# Patient Record
Sex: MALE | Race: WHITE | NOT HISPANIC OR LATINO | URBAN - METROPOLITAN AREA
[De-identification: names, ages, dates, MRNs, and addresses within clinical notes are randomized per-mention and may not be internally consistent; named-entity substitution may affect disease eponyms.]

---

## 2017-05-26 ENCOUNTER — OUTPATIENT (OUTPATIENT)
Dept: OUTPATIENT SERVICES | Facility: HOSPITAL | Age: 62
LOS: 1 days | Discharge: HOME | End: 2017-05-26
Payer: COMMERCIAL

## 2017-05-26 DIAGNOSIS — K57.30 DIVERTICULOSIS OF LARGE INTESTINE WITHOUT PERFORATION OR ABSCESS WITHOUT BLEEDING: ICD-10-CM

## 2017-05-26 DIAGNOSIS — Z98.890 OTHER SPECIFIED POSTPROCEDURAL STATES: Chronic | ICD-10-CM

## 2017-05-26 DIAGNOSIS — Z12.11 ENCOUNTER FOR SCREENING FOR MALIGNANT NEOPLASM OF COLON: ICD-10-CM

## 2017-05-26 DIAGNOSIS — K64.8 OTHER HEMORRHOIDS: ICD-10-CM

## 2017-05-26 DIAGNOSIS — Z95.5 PRESENCE OF CORONARY ANGIOPLASTY IMPLANT AND GRAFT: ICD-10-CM

## 2017-05-26 DIAGNOSIS — Z95.5 PRESENCE OF CORONARY ANGIOPLASTY IMPLANT AND GRAFT: Chronic | ICD-10-CM

## 2017-05-26 DIAGNOSIS — Z79.82 LONG TERM (CURRENT) USE OF ASPIRIN: ICD-10-CM

## 2017-05-26 DIAGNOSIS — I25.10 ATHEROSCLEROTIC HEART DISEASE OF NATIVE CORONARY ARTERY WITHOUT ANGINA PECTORIS: ICD-10-CM

## 2017-05-26 DIAGNOSIS — Z01.818 ENCOUNTER FOR OTHER PREPROCEDURAL EXAMINATION: ICD-10-CM

## 2017-05-26 LAB
ANION GAP SERPL CALC-SCNC: 4 MMOL/L — LOW (ref 5–17)
BASOPHILS # BLD AUTO: 0.1 K/UL — SIGNIFICANT CHANGE UP (ref 0–0.2)
BASOPHILS NFR BLD AUTO: 1.6 % — SIGNIFICANT CHANGE UP (ref 0–2)
BUN SERPL-MCNC: 28 MG/DL — HIGH (ref 7–23)
CALCIUM SERPL-MCNC: 8.9 MG/DL — SIGNIFICANT CHANGE UP (ref 8.5–10.1)
CHLORIDE SERPL-SCNC: 108 MMOL/L — SIGNIFICANT CHANGE UP (ref 96–108)
CO2 SERPL-SCNC: 27 MMOL/L — SIGNIFICANT CHANGE UP (ref 22–31)
CREAT SERPL-MCNC: 1.17 MG/DL — SIGNIFICANT CHANGE UP (ref 0.5–1.3)
EOSINOPHIL # BLD AUTO: 0.2 K/UL — SIGNIFICANT CHANGE UP (ref 0–0.5)
EOSINOPHIL NFR BLD AUTO: 3 % — SIGNIFICANT CHANGE UP (ref 0–6)
GLUCOSE SERPL-MCNC: 91 MG/DL — SIGNIFICANT CHANGE UP (ref 70–99)
HCT VFR BLD CALC: 48.2 % — SIGNIFICANT CHANGE UP (ref 39–50)
HGB BLD-MCNC: 15.9 G/DL — SIGNIFICANT CHANGE UP (ref 13–17)
LYMPHOCYTES # BLD AUTO: 2.5 K/UL — SIGNIFICANT CHANGE UP (ref 1–3.3)
LYMPHOCYTES # BLD AUTO: 38.5 % — SIGNIFICANT CHANGE UP (ref 13–44)
MCHC RBC-ENTMCNC: 29.6 PG — SIGNIFICANT CHANGE UP (ref 27–34)
MCHC RBC-ENTMCNC: 33 GM/DL — SIGNIFICANT CHANGE UP (ref 32–36)
MCV RBC AUTO: 89.6 FL — SIGNIFICANT CHANGE UP (ref 80–100)
MONOCYTES # BLD AUTO: 0.5 K/UL — SIGNIFICANT CHANGE UP (ref 0–0.9)
MONOCYTES NFR BLD AUTO: 7.4 % — SIGNIFICANT CHANGE UP (ref 2–14)
NEUTROPHILS # BLD AUTO: 3.3 K/UL — SIGNIFICANT CHANGE UP (ref 1.8–7.4)
NEUTROPHILS NFR BLD AUTO: 49.5 % — SIGNIFICANT CHANGE UP (ref 43–77)
PLATELET # BLD AUTO: 258 K/UL — SIGNIFICANT CHANGE UP (ref 150–400)
POTASSIUM SERPL-MCNC: 4.1 MMOL/L — SIGNIFICANT CHANGE UP (ref 3.5–5.3)
POTASSIUM SERPL-SCNC: 4.1 MMOL/L — SIGNIFICANT CHANGE UP (ref 3.5–5.3)
RBC # BLD: 5.38 M/UL — SIGNIFICANT CHANGE UP (ref 4.2–5.8)
RBC # FLD: 12 % — SIGNIFICANT CHANGE UP (ref 10.3–14.5)
SODIUM SERPL-SCNC: 139 MMOL/L — SIGNIFICANT CHANGE UP (ref 135–145)
WBC # BLD: 6.6 K/UL — SIGNIFICANT CHANGE UP (ref 3.8–10.5)
WBC # FLD AUTO: 6.6 K/UL — SIGNIFICANT CHANGE UP (ref 3.8–10.5)

## 2017-05-26 PROCEDURE — 93010 ELECTROCARDIOGRAM REPORT: CPT

## 2017-05-26 NOTE — CHART NOTE - NSCHARTNOTEFT_GEN_A_CORE
Vital Signs: Height 6' 4" B/P 136/81, HR 57, Resp 18, Temp 97.5F, O2 Sat 99% on room air    Plan    1. NPO after midnight  2. Patient instructed to follow instructions from Dr. Whitaker for bowel prep and day of procedure medications

## 2017-06-02 ENCOUNTER — OUTPATIENT (OUTPATIENT)
Dept: OUTPATIENT SERVICES | Facility: HOSPITAL | Age: 62
LOS: 1 days | Discharge: HOME | End: 2017-06-02

## 2017-06-02 VITALS
WEIGHT: 254.85 LBS | RESPIRATION RATE: 18 BRPM | OXYGEN SATURATION: 100 % | SYSTOLIC BLOOD PRESSURE: 153 MMHG | HEART RATE: 54 BPM | TEMPERATURE: 98 F | HEIGHT: 75 IN | DIASTOLIC BLOOD PRESSURE: 86 MMHG

## 2017-06-02 DIAGNOSIS — Z98.890 OTHER SPECIFIED POSTPROCEDURAL STATES: Chronic | ICD-10-CM

## 2017-06-02 DIAGNOSIS — Z95.5 PRESENCE OF CORONARY ANGIOPLASTY IMPLANT AND GRAFT: Chronic | ICD-10-CM

## 2017-06-02 RX ORDER — SODIUM CHLORIDE 9 MG/ML
1000 INJECTION INTRAMUSCULAR; INTRAVENOUS; SUBCUTANEOUS
Qty: 0 | Refills: 0 | Status: DISCONTINUED | OUTPATIENT
Start: 2017-06-02 | End: 2017-06-17

## 2017-06-06 DIAGNOSIS — K57.30 DIVERTICULOSIS OF LARGE INTESTINE WITHOUT PERFORATION OR ABSCESS WITHOUT BLEEDING: ICD-10-CM

## 2017-06-06 DIAGNOSIS — Z95.5 PRESENCE OF CORONARY ANGIOPLASTY IMPLANT AND GRAFT: ICD-10-CM

## 2017-06-06 DIAGNOSIS — I25.10 ATHEROSCLEROTIC HEART DISEASE OF NATIVE CORONARY ARTERY WITHOUT ANGINA PECTORIS: ICD-10-CM

## 2017-06-06 DIAGNOSIS — Z79.82 LONG TERM (CURRENT) USE OF ASPIRIN: ICD-10-CM

## 2017-06-06 DIAGNOSIS — K64.8 OTHER HEMORRHOIDS: ICD-10-CM

## 2017-06-06 DIAGNOSIS — Z12.11 ENCOUNTER FOR SCREENING FOR MALIGNANT NEOPLASM OF COLON: ICD-10-CM

## 2021-04-21 ENCOUNTER — INPATIENT (INPATIENT)
Facility: HOSPITAL | Age: 66
LOS: 1 days | Discharge: HOME | DRG: 552 | End: 2021-04-23
Attending: INTERNAL MEDICINE | Admitting: INTERNAL MEDICINE
Payer: MEDICARE

## 2021-04-21 VITALS
HEIGHT: 75 IN | DIASTOLIC BLOOD PRESSURE: 108 MMHG | SYSTOLIC BLOOD PRESSURE: 189 MMHG | OXYGEN SATURATION: 100 % | WEIGHT: 220.02 LBS | RESPIRATION RATE: 22 BRPM | HEART RATE: 91 BPM | TEMPERATURE: 97 F

## 2021-04-21 DIAGNOSIS — M54.9 DORSALGIA, UNSPECIFIED: ICD-10-CM

## 2021-04-21 DIAGNOSIS — Z98.890 OTHER SPECIFIED POSTPROCEDURAL STATES: Chronic | ICD-10-CM

## 2021-04-21 DIAGNOSIS — Z95.5 PRESENCE OF CORONARY ANGIOPLASTY IMPLANT AND GRAFT: Chronic | ICD-10-CM

## 2021-04-21 PROBLEM — I25.10 ATHEROSCLEROTIC HEART DISEASE OF NATIVE CORONARY ARTERY WITHOUT ANGINA PECTORIS: Chronic | Status: ACTIVE | Noted: 2017-05-26

## 2021-04-21 PROBLEM — E78.5 HYPERLIPIDEMIA, UNSPECIFIED: Chronic | Status: ACTIVE | Noted: 2017-05-26

## 2021-04-21 LAB
ALBUMIN SERPL ELPH-MCNC: 4.3 G/DL — SIGNIFICANT CHANGE UP (ref 3.3–5)
ALP SERPL-CCNC: 61 U/L — SIGNIFICANT CHANGE UP (ref 40–120)
ALT FLD-CCNC: 35 U/L — SIGNIFICANT CHANGE UP (ref 12–78)
ANION GAP SERPL CALC-SCNC: 5 MMOL/L — SIGNIFICANT CHANGE UP (ref 5–17)
APPEARANCE UR: CLEAR — SIGNIFICANT CHANGE UP
APTT BLD: 29.2 SEC — SIGNIFICANT CHANGE UP (ref 27.5–35.5)
AST SERPL-CCNC: 15 U/L — SIGNIFICANT CHANGE UP (ref 15–37)
BASOPHILS # BLD AUTO: 0.08 K/UL — SIGNIFICANT CHANGE UP (ref 0–0.2)
BASOPHILS NFR BLD AUTO: 0.8 % — SIGNIFICANT CHANGE UP (ref 0–2)
BILIRUB SERPL-MCNC: 1.1 MG/DL — SIGNIFICANT CHANGE UP (ref 0.2–1.2)
BILIRUB UR-MCNC: NEGATIVE — SIGNIFICANT CHANGE UP
BUN SERPL-MCNC: 26 MG/DL — HIGH (ref 7–23)
CALCIUM SERPL-MCNC: 9.3 MG/DL — SIGNIFICANT CHANGE UP (ref 8.5–10.1)
CHLORIDE SERPL-SCNC: 104 MMOL/L — SIGNIFICANT CHANGE UP (ref 96–108)
CO2 SERPL-SCNC: 28 MMOL/L — SIGNIFICANT CHANGE UP (ref 22–31)
COLOR SPEC: YELLOW — SIGNIFICANT CHANGE UP
CREAT SERPL-MCNC: 1.08 MG/DL — SIGNIFICANT CHANGE UP (ref 0.5–1.3)
DIFF PNL FLD: NEGATIVE — SIGNIFICANT CHANGE UP
EOSINOPHIL # BLD AUTO: 0.1 K/UL — SIGNIFICANT CHANGE UP (ref 0–0.5)
EOSINOPHIL NFR BLD AUTO: 1.1 % — SIGNIFICANT CHANGE UP (ref 0–6)
GLUCOSE SERPL-MCNC: 92 MG/DL — SIGNIFICANT CHANGE UP (ref 70–99)
GLUCOSE UR QL: NEGATIVE MG/DL — SIGNIFICANT CHANGE UP
HCT VFR BLD CALC: 48.5 % — SIGNIFICANT CHANGE UP (ref 39–50)
HGB BLD-MCNC: 16 G/DL — SIGNIFICANT CHANGE UP (ref 13–17)
IMM GRANULOCYTES NFR BLD AUTO: 0.2 % — SIGNIFICANT CHANGE UP (ref 0–1.5)
INR BLD: 1.05 RATIO — SIGNIFICANT CHANGE UP (ref 0.88–1.16)
KETONES UR-MCNC: ABNORMAL
LEUKOCYTE ESTERASE UR-ACNC: NEGATIVE — SIGNIFICANT CHANGE UP
LYMPHOCYTES # BLD AUTO: 2.37 K/UL — SIGNIFICANT CHANGE UP (ref 1–3.3)
LYMPHOCYTES # BLD AUTO: 25.2 % — SIGNIFICANT CHANGE UP (ref 13–44)
MCHC RBC-ENTMCNC: 29.8 PG — SIGNIFICANT CHANGE UP (ref 27–34)
MCHC RBC-ENTMCNC: 33 GM/DL — SIGNIFICANT CHANGE UP (ref 32–36)
MCV RBC AUTO: 90.3 FL — SIGNIFICANT CHANGE UP (ref 80–100)
MONOCYTES # BLD AUTO: 0.67 K/UL — SIGNIFICANT CHANGE UP (ref 0–0.9)
MONOCYTES NFR BLD AUTO: 7.1 % — SIGNIFICANT CHANGE UP (ref 2–14)
NEUTROPHILS # BLD AUTO: 6.18 K/UL — SIGNIFICANT CHANGE UP (ref 1.8–7.4)
NEUTROPHILS NFR BLD AUTO: 65.6 % — SIGNIFICANT CHANGE UP (ref 43–77)
NITRITE UR-MCNC: NEGATIVE — SIGNIFICANT CHANGE UP
NT-PROBNP SERPL-SCNC: 17 PG/ML — SIGNIFICANT CHANGE UP (ref 0–125)
PH UR: 5 — SIGNIFICANT CHANGE UP (ref 5–8)
PLATELET # BLD AUTO: 279 K/UL — SIGNIFICANT CHANGE UP (ref 150–400)
POTASSIUM SERPL-MCNC: 4.3 MMOL/L — SIGNIFICANT CHANGE UP (ref 3.5–5.3)
POTASSIUM SERPL-SCNC: 4.3 MMOL/L — SIGNIFICANT CHANGE UP (ref 3.5–5.3)
PROT SERPL-MCNC: 8.4 GM/DL — HIGH (ref 6–8.3)
PROT UR-MCNC: 15 MG/DL
PROTHROM AB SERPL-ACNC: 12.3 SEC — SIGNIFICANT CHANGE UP (ref 10.6–13.6)
RBC # BLD: 5.37 M/UL — SIGNIFICANT CHANGE UP (ref 4.2–5.8)
RBC # FLD: 12.8 % — SIGNIFICANT CHANGE UP (ref 10.3–14.5)
SARS-COV-2 RNA SPEC QL NAA+PROBE: SIGNIFICANT CHANGE UP
SODIUM SERPL-SCNC: 137 MMOL/L — SIGNIFICANT CHANGE UP (ref 135–145)
SP GR SPEC: 1.02 — SIGNIFICANT CHANGE UP (ref 1.01–1.02)
TROPONIN I SERPL-MCNC: <0.015 NG/ML — SIGNIFICANT CHANGE UP (ref 0.01–0.04)
UROBILINOGEN FLD QL: NEGATIVE MG/DL — SIGNIFICANT CHANGE UP
WBC # BLD: 9.42 K/UL — SIGNIFICANT CHANGE UP (ref 3.8–10.5)
WBC # FLD AUTO: 9.42 K/UL — SIGNIFICANT CHANGE UP (ref 3.8–10.5)

## 2021-04-21 PROCEDURE — 93970 EXTREMITY STUDY: CPT | Mod: 26

## 2021-04-21 PROCEDURE — 93010 ELECTROCARDIOGRAM REPORT: CPT

## 2021-04-21 PROCEDURE — 71045 X-RAY EXAM CHEST 1 VIEW: CPT | Mod: 26

## 2021-04-21 PROCEDURE — 99221 1ST HOSP IP/OBS SF/LOW 40: CPT

## 2021-04-21 PROCEDURE — 86803 HEPATITIS C AB TEST: CPT

## 2021-04-21 PROCEDURE — 72131 CT LUMBAR SPINE W/O DYE: CPT | Mod: 26

## 2021-04-21 PROCEDURE — 99285 EMERGENCY DEPT VISIT HI MDM: CPT

## 2021-04-21 PROCEDURE — 81001 URINALYSIS AUTO W/SCOPE: CPT

## 2021-04-21 PROCEDURE — 86769 SARS-COV-2 COVID-19 ANTIBODY: CPT

## 2021-04-21 PROCEDURE — 36415 COLL VENOUS BLD VENIPUNCTURE: CPT

## 2021-04-21 PROCEDURE — 99223 1ST HOSP IP/OBS HIGH 75: CPT

## 2021-04-21 PROCEDURE — 72148 MRI LUMBAR SPINE W/O DYE: CPT

## 2021-04-21 PROCEDURE — 99222 1ST HOSP IP/OBS MODERATE 55: CPT

## 2021-04-21 RX ORDER — ONDANSETRON 8 MG/1
4 TABLET, FILM COATED ORAL EVERY 6 HOURS
Refills: 0 | Status: DISCONTINUED | OUTPATIENT
Start: 2021-04-21 | End: 2021-04-23

## 2021-04-21 RX ORDER — DICLOFENAC SODIUM 75 MG/1
1 TABLET, DELAYED RELEASE ORAL
Qty: 0 | Refills: 0 | DISCHARGE

## 2021-04-21 RX ORDER — DEXAMETHASONE 0.5 MG/5ML
10 ELIXIR ORAL ONCE
Refills: 0 | Status: COMPLETED | OUTPATIENT
Start: 2021-04-21 | End: 2021-04-21

## 2021-04-21 RX ORDER — MORPHINE SULFATE 50 MG/1
4 CAPSULE, EXTENDED RELEASE ORAL ONCE
Refills: 0 | Status: DISCONTINUED | OUTPATIENT
Start: 2021-04-21 | End: 2021-04-21

## 2021-04-21 RX ORDER — ONDANSETRON 8 MG/1
4 TABLET, FILM COATED ORAL ONCE
Refills: 0 | Status: COMPLETED | OUTPATIENT
Start: 2021-04-21 | End: 2021-04-21

## 2021-04-21 RX ORDER — ASPIRIN/CALCIUM CARB/MAGNESIUM 324 MG
1 TABLET ORAL
Qty: 0 | Refills: 0 | DISCHARGE

## 2021-04-21 RX ORDER — MORPHINE SULFATE 50 MG/1
2 CAPSULE, EXTENDED RELEASE ORAL EVERY 4 HOURS
Refills: 0 | Status: DISCONTINUED | OUTPATIENT
Start: 2021-04-21 | End: 2021-04-23

## 2021-04-21 RX ORDER — DICLOFENAC SODIUM 75 MG/1
50 TABLET, DELAYED RELEASE ORAL THREE TIMES A DAY
Refills: 0 | Status: DISCONTINUED | OUTPATIENT
Start: 2021-04-21 | End: 2021-04-22

## 2021-04-21 RX ORDER — DIAZEPAM 5 MG
5 TABLET ORAL ONCE
Refills: 0 | Status: DISCONTINUED | OUTPATIENT
Start: 2021-04-21 | End: 2021-04-21

## 2021-04-21 RX ORDER — SIMVASTATIN 20 MG/1
1 TABLET, FILM COATED ORAL
Qty: 0 | Refills: 0 | DISCHARGE

## 2021-04-21 RX ORDER — ACETAMINOPHEN 500 MG
650 TABLET ORAL EVERY 6 HOURS
Refills: 0 | Status: DISCONTINUED | OUTPATIENT
Start: 2021-04-21 | End: 2021-04-23

## 2021-04-21 RX ORDER — SIMVASTATIN 20 MG/1
20 TABLET, FILM COATED ORAL AT BEDTIME
Refills: 0 | Status: DISCONTINUED | OUTPATIENT
Start: 2021-04-21 | End: 2021-04-23

## 2021-04-21 RX ORDER — CYCLOBENZAPRINE HYDROCHLORIDE 10 MG/1
10 TABLET, FILM COATED ORAL THREE TIMES A DAY
Refills: 0 | Status: DISCONTINUED | OUTPATIENT
Start: 2021-04-21 | End: 2021-04-23

## 2021-04-21 RX ORDER — KETOROLAC TROMETHAMINE 30 MG/ML
30 SYRINGE (ML) INJECTION ONCE
Refills: 0 | Status: DISCONTINUED | OUTPATIENT
Start: 2021-04-21 | End: 2021-04-21

## 2021-04-21 RX ORDER — SENNA PLUS 8.6 MG/1
2 TABLET ORAL AT BEDTIME
Refills: 0 | Status: DISCONTINUED | OUTPATIENT
Start: 2021-04-21 | End: 2021-04-23

## 2021-04-21 RX ORDER — HYDROMORPHONE HYDROCHLORIDE 2 MG/ML
1 INJECTION INTRAMUSCULAR; INTRAVENOUS; SUBCUTANEOUS ONCE
Refills: 0 | Status: DISCONTINUED | OUTPATIENT
Start: 2021-04-21 | End: 2021-04-21

## 2021-04-21 RX ADMIN — Medication 30 MILLIGRAM(S): at 14:28

## 2021-04-21 RX ADMIN — DICLOFENAC SODIUM 50 MILLIGRAM(S): 75 TABLET, DELAYED RELEASE ORAL at 23:11

## 2021-04-21 RX ADMIN — MORPHINE SULFATE 4 MILLIGRAM(S): 50 CAPSULE, EXTENDED RELEASE ORAL at 17:05

## 2021-04-21 RX ADMIN — HYDROMORPHONE HYDROCHLORIDE 1 MILLIGRAM(S): 2 INJECTION INTRAMUSCULAR; INTRAVENOUS; SUBCUTANEOUS at 17:15

## 2021-04-21 RX ADMIN — Medication 5 MILLIGRAM(S): at 14:27

## 2021-04-21 RX ADMIN — CYCLOBENZAPRINE HYDROCHLORIDE 10 MILLIGRAM(S): 10 TABLET, FILM COATED ORAL at 23:11

## 2021-04-21 RX ADMIN — ONDANSETRON 4 MILLIGRAM(S): 8 TABLET, FILM COATED ORAL at 21:42

## 2021-04-21 RX ADMIN — Medication 102 MILLIGRAM(S): at 16:49

## 2021-04-21 RX ADMIN — CYCLOBENZAPRINE HYDROCHLORIDE 10 MILLIGRAM(S): 10 TABLET, FILM COATED ORAL at 21:42

## 2021-04-21 RX ADMIN — MORPHINE SULFATE 2 MILLIGRAM(S): 50 CAPSULE, EXTENDED RELEASE ORAL at 21:42

## 2021-04-21 RX ADMIN — ONDANSETRON 4 MILLIGRAM(S): 8 TABLET, FILM COATED ORAL at 13:33

## 2021-04-21 RX ADMIN — MORPHINE SULFATE 4 MILLIGRAM(S): 50 CAPSULE, EXTENDED RELEASE ORAL at 13:33

## 2021-04-21 RX ADMIN — Medication 30 MILLIGRAM(S): at 15:19

## 2021-04-21 RX ADMIN — SIMVASTATIN 20 MILLIGRAM(S): 20 TABLET, FILM COATED ORAL at 23:12

## 2021-04-21 RX ADMIN — HYDROMORPHONE HYDROCHLORIDE 1 MILLIGRAM(S): 2 INJECTION INTRAMUSCULAR; INTRAVENOUS; SUBCUTANEOUS at 15:18

## 2021-04-21 NOTE — H&P ADULT - HISTORY OF PRESENT ILLNESS
65 y/o M PMHx significant for CAD s/p stents, Hyperlipidemia, and long standing lower back pain x 30 years s/p multiple epidural injections (last 11/2020) and microdiscectomy, presents to  for further evaluation of severe lower back pain which began after lifting a heavy table 2 weeks ago. He describes his symptoms as 10/10 in intensity back pain, worse with movement, and radiate to his bilateral knees. He reportedly made an appointment with Dr. Miles for an epidural injection in 2 days, but was referred to the ED for further evaluation as this morning the patient noted bilateral lower extremity edema. The patient denies any recent trauma.  CT LS spine => No acute fracture or traumatic subluxation. Mild to moderate disc space narrowing degenerative changes with congenital spinal canal stenosis is present in the lumbar region. US Venous Duplex Bilateral LE => No evidence of deep venous thrombosis in either lower extremity. CXR => Negative chest.

## 2021-04-21 NOTE — ED ADULT NURSE NOTE - OBJECTIVE STATEMENT
pt presents to the ED c/o back pain. Pt states that he has a hx of back pain. This pain started two weeks ago after he was doing yard work and aggravated the pain. Pt states his pain is getting worse over the last couple of days. Pt has an appointment with Dr. Miles for a epidural injection in 2 days. Pt has been taking muscle relaxers and Tramadol for his pain. Today, pt states his feet were swollen so he called his doctor who advised him to come in to the ED. pt presents to the ED c/o back pain. Pt states that he has a hx of back pain. This pain started two weeks ago after he was doing yard work and aggravated the pain. Pt states his pain is getting worse over the last couple of days. Pt has an appointment with Dr. Miles for a epidural injection in 2 days. Pt has been taking muscle relaxers and Tramadol for his pain. Today, pt states his feet were swollen so he called his doctor who advised him to come in to the ED. 18 g placed to left Ac. labs sent.

## 2021-04-21 NOTE — ED STATDOCS - CLINICAL SUMMARY MEDICAL DECISION MAKING FREE TEXT BOX
No signs of DVT or CHF.  CT lumbar spine largely unremarkable.  Pt without red flags for back pain, does not need stat MRI, but continues to have intractable pain despite medications.  Admit for intractable pain, spine consult as inpatient.

## 2021-04-21 NOTE — ED STATDOCS - PROGRESS NOTE DETAILS
Patient seen and evaluated.  After multiple different doses of pain medications, patient still with intractable back pain and spasm.  Cannot function at home.  Patient agreeable to admission.  Case d/w neurosurgery PA as Dr. Ellis is on call today.  Case endorsed to Dr. Michael Keenan PA-C

## 2021-04-21 NOTE — ED ADULT NURSE NOTE - NSIMPLEMENTINTERV_GEN_ALL_ED
Implemented All Universal Safety Interventions:  [place] to call system. Call bell, personal items and telephone within reach. Instruct patient to call for assistance. Room bathroom lighting operational. Non-slip footwear when patient is off stretcher. Physically safe environment: no spills, clutter or unnecessary equipment. Stretcher in lowest position, wheels locked, appropriate side rails in place.

## 2021-04-21 NOTE — H&P ADULT - ASSESSMENT
67 y/o M PMHx significant for CAD s/p stents, Hyperlipidemia, and long standing lower back pain x 30 years s/p multiple epidural injections (last 11/2020) and microdiscectomy, presents to  for further evaluation of severe lower back pain which began after lifting a heavy table 2 weeks ago. He describes his symptoms as 10/10 in intensity back pain, worse with movement, and radiate to his bilateral knees. He reportedly made an appointment with Dr. Miles for an epidural injection in 2 days, but was referred to the ED for further evaluation as this morning the patient noted bilateral lower extremity edema. The patient denies any recent trauma.  CT LS spine => No acute fracture or traumatic subluxation. Mild to moderate disc space narrowing degenerative changes with congenital spinal canal stenosis is present in the lumbar region. US Venous Duplex Bilateral LE => No evidence of deep venous thrombosis in either lower extremity. CXR => Negative chest.    #Severe Lower Back Pain  ~admit to Medicine  ~f/u w/ Ortho-Spine  ~per Ortho-spine neurosurgical intervention   ~f/u MRI L spine, may require anesthesia  ~cont. Pain management    #Vte ppx  ~IMPROVE Vte Risk Score is 1  ~cont. SCDs for now 65 y/o M PMHx significant for CAD s/p stents, Hyperlipidemia, and long standing lower back pain x 30 years s/p multiple epidural injections (last 11/2020) and microdiscectomy, presents to  for further evaluation of severe lower back pain which began after lifting a heavy table 2 weeks ago. He describes his symptoms as 10/10 in intensity back pain, worse with movement, and radiate to his bilateral knees. He reportedly made an appointment with Dr. Miles for an epidural injection in 2 days, but was referred to the ED for further evaluation as this morning the patient noted bilateral lower extremity edema. The patient denies any recent trauma.  CT LS spine => No acute fracture or traumatic subluxation. Mild to moderate disc space narrowing degenerative changes with congenital spinal canal stenosis is present in the lumbar region. US Venous Duplex Bilateral LE => No evidence of deep venous thrombosis in either lower extremity. CXR => Negative chest.    #Severe Lower Back Pain  ~admit to Medicine  ~f/u w/ Ortho-Spine  ~per Ortho-spine neurosurgical intervention   ~f/u MRI L spine, may require anesthesia  ~cont. Pain management    #Hyperlipidemia  ~cont. statin therapy with Simvastatin 20mg po qhs    #Vte ppx  ~IMPROVE Vte Risk Score is 1  ~cont. SCDs for now

## 2021-04-21 NOTE — CONSULT NOTE ADULT - SUBJECTIVE AND OBJECTIVE BOX
Patient is a 66y old  Male who presents with a chief complaint of Intractable back pain    HPI:  65 yo male PMH long standing lower back pain x 30 yrs s/p multiple ED injections (last 11/2020) and microdiscectomy, CAD s/p stents, HLD presents to the ED c/o back pain. As per pt, he was trying to lift a glass table top 2 weeks ago and "felt it happen". He describes spasm type pain, worse with movement, associated b/l radiculopathy to knees. Pt states his pain is getting worse over the last couple of days. Pt has an appointment with Dr. Miles for a epidural injection in 2 days. This morning pt noted b/l LE edema so he called his doctor who advised him to come in to the ED. LE dopplers neg for clot. CT L spine sig for degenerative changes / stenosis. At the time of my exam pt appears to be in mild distress 2nd to pain. Pt denies HA, visual changes, focal numb / ting / weak, word finding difficulty, neck stiffness, photophobia.         PAST MEDICAL & SURGICAL HISTORY:  HLD (hyperlipidemia)  CAD (coronary artery disease)  H/O heart artery stent  2006  H/O discectomy  8/2016        FAMILY HISTORY:  Negative for stroke   Noncontributory         Social Hx:  Nonsmoker, no drug or alcohol use        Allergies  apple (Unknown)  No Known Drug Allergies  Pears (Unknown)        MEDICATIONS  (STANDING):  diclofenac 50 milliGRAM(s) Oral three times a day  simvastatin 20 milliGRAM(s) Oral at bedtime         ROS: Pertinent positives in HPI, all other ROS were reviewed and are negative.          Vital Signs Last 24 Hrs  T(C): 36.3 (21 Apr 2021 15:25), Max: 36.3 (21 Apr 2021 12:29)  T(F): 97.3 (21 Apr 2021 15:25), Max: 97.3 (21 Apr 2021 12:29)  HR: 88 (21 Apr 2021 15:25) (88 - 91)  BP: 153/88 (21 Apr 2021 15:25) (153/88 - 189/108)  RR: 20 (21 Apr 2021 15:25) (20 - 22)  SpO2: 98% (21 Apr 2021 15:25) (98% - 100%)        Labs:                        16.0   9.42  )-----------( 279      ( 21 Apr 2021 13:21 )             48.5     04-21    137  |  104  |  26<H>  ----------------------------<  92  4.3   |  28  |  1.08    Ca    9.3      21 Apr 2021 13:21    TPro  8.4<H>  /  Alb  4.3  /  TBili  1.1  /  DBili  x   /  AST  15  /  ALT  35  /  AlkPhos  61  04-21    PT/INR - ( 21 Apr 2021 13:21 )   PT: 12.3 sec;   INR: 1.05 ratio      PTT - ( 21 Apr 2021 13:21 )  PTT:29.2 sec        Radiology report:  CT Lumbar Spine No Cont (04.21.21 @ 13:55) >  FINDINGS:    No acute fracture or traumatic subluxation. Mild to moderate disc spacenarrowing at L5-S1.    Prevertebral and paravertebral soft tissues are unremarkable.    Congenital spinal canal stenosis is present in the lumbar region.    Evaluation of the spinal canal contents is limited by CT modality.    T11-T12: Mild bilateralfacet hypertrophy. No acquired spinal canal stenosis or neural foraminal narrowing.    T12-L1: Mild broad-based disc osteophyte complex results in mild spinal canal stenosis. No neural foraminal narrowing.    L1-L2: Broad-based disc osteophyte complex. Mild acquired spinal canal stenosis. Platelike ossification extending inferiorly from the left L1 lamina into the left neural foramen results in moderate left neural foraminal narrowing.    L2-L3: Broad-based disc protrusion results in mild acquired spinal canal stenosis. No neural foraminal narrowing.    L3-L4: Disc bulge and bilateral facet hypertrophy. Probable moderate acquired spinal canal stenosis. Moderate left and mild right neural foraminal narrowing.    L4-L5: Broad-based disc protrusion and bilateral facet hypertrophy. Mild acquired spinal canal stenosis. And mild to moderate bilateral neural foraminal narrowing.    L5-S1: Mild disc bulge. Mild spinal canal stenosis. Moderate left and mild right neural foraminal narrowing.    IMPRESSION:    No acute fracture or traumatic subluxation.        Physical Exam:  Constitutional: Awake / alert  HEENT: PERRLA, EOMI  Neck: Supple  Respiratory: Breath sounds are clear bilaterally  Cardiovascular: S1 and S2, regular rhythm  Gastrointestinal: Soft, NT/ND  Extremities: 1+ b/l LE edema  Vascular: No carotid Bruit  Musculoskeletal: no joint swelling/tenderness, no abnormal movements  Skin: No rashes    Neurological Exam:  HF: A x O x 3, appropriately interactive, normal affect, speech fluent, no aphasia or paraphasic errors. Naming /repetition intact   CN: PERRL, EOMI, facial sensation normal, no NLFD, tongue midline  Motor: No pronator drift, Strength 5/5 in all 4 ext, normal bulk and tone, no tremor, rigidity or bradykinesia  Sens: Intact to light touch  Reflexes: Symmetric and normal, downgoing toes b/l  Coord:  No FNFA, dysmetria, AARON intact   Gait/Balance: Cannot test

## 2021-04-21 NOTE — ED ADULT NURSE NOTE - CHIEF COMPLAINT QUOTE
PT. c/o 10/10 back pain that has been worsen over the last few weeks, pt. states he had hx of herniated disc and follows with pain management, pt. states sitting is better for pain given 100mcg of fentanyl PTA by EMS. NO acute trauma, pt. has appointment on friday for epidural injection but could not wait.

## 2021-04-21 NOTE — CONSULT NOTE ADULT - ASSESSMENT
65 yo male PMH long standing lower back pain x 30 yrs s/p multiple ED injections (last 11/2020) and microdiscectomy, CAD s/p stents, HLD presents to the ED c/o back pain. As per pt, he was trying to lift a glass table top 2 weeks ago and "felt it happen". He describes spasm type pain, worse with movement, associated b/l radiculopathy to knees. Pt states his pain is getting worse over the last couple of days. This morning pt noted b/l LE edema so he called his doctor who advised him to come in to the ED. LE dopplers neg for clot. CT L spine sig for degenerative changes / stenosis.    - No acute neurosurgical intervention   - MRI L spine, may require anesthesia  - Pain mgmt  - Will follow    Discussed with Dr Ellis

## 2021-04-21 NOTE — ED STATDOCS - MUSCULOSKELETAL, MLM
range of motion is not limited and there is no muscle tenderness. +1+ pedal edema, bilateral and trace pedal edema bilateral

## 2021-04-21 NOTE — H&P ADULT - NSHPPHYSICALEXAM_GEN_ALL_CORE
Vital Signs Last 24 Hrs  T(C): 36.3 (21 Apr 2021 15:25), Max: 36.3 (21 Apr 2021 12:29)  T(F): 97.3 (21 Apr 2021 15:25), Max: 97.3 (21 Apr 2021 12:29)  HR: 88 (21 Apr 2021 15:25) (88 - 91)  BP: 153/88 (21 Apr 2021 15:25) (153/88 - 189/108)  RR: 20 (21 Apr 2021 15:25) (20 - 22)  SpO2: 98% (21 Apr 2021 15:25) (98% - 100%)

## 2021-04-21 NOTE — ED STATDOCS - OBJECTIVE STATEMENT
67 y/o male with a PMHx of herniated discs, CAD s/p stents, HLD presents to the ED c/o back pain. Pt states that he has a hx of back pain. This pain started two weeks ago after he was doing yard work and aggravated the pain. Pt states his pain is getting worse over the last couple of days. Pt has an appointment with Dr. Miles for a epidural injection in 2 days. Pt has been taking muscle relaxers and Tramadol for his pain. Today, pt states his feet were swollen so he called his doctor who advised him to come in to the ED.

## 2021-04-22 LAB
COVID-19 SPIKE DOMAIN AB INTERP: POSITIVE
COVID-19 SPIKE DOMAIN ANTIBODY RESULT: >250 U/ML — HIGH
HCV AB S/CO SERPL IA: 0.16 S/CO — SIGNIFICANT CHANGE UP (ref 0–0.99)
HCV AB SERPL-IMP: SIGNIFICANT CHANGE UP
SARS-COV-2 IGG+IGM SERPL QL IA: >250 U/ML — HIGH
SARS-COV-2 IGG+IGM SERPL QL IA: POSITIVE

## 2021-04-22 PROCEDURE — 99232 SBSQ HOSP IP/OBS MODERATE 35: CPT

## 2021-04-22 PROCEDURE — 72148 MRI LUMBAR SPINE W/O DYE: CPT | Mod: 26

## 2021-04-22 RX ORDER — FENTANYL CITRATE 50 UG/ML
50 INJECTION INTRAVENOUS
Refills: 0 | Status: DISCONTINUED | OUTPATIENT
Start: 2021-04-22 | End: 2021-04-22

## 2021-04-22 RX ORDER — LIDOCAINE 4 G/100G
2 CREAM TOPICAL DAILY
Refills: 0 | Status: DISCONTINUED | OUTPATIENT
Start: 2021-04-22 | End: 2021-04-23

## 2021-04-22 RX ORDER — HYDROMORPHONE HYDROCHLORIDE 2 MG/ML
0.5 INJECTION INTRAMUSCULAR; INTRAVENOUS; SUBCUTANEOUS
Refills: 0 | Status: DISCONTINUED | OUTPATIENT
Start: 2021-04-22 | End: 2021-04-22

## 2021-04-22 RX ORDER — OXYCODONE HYDROCHLORIDE 5 MG/1
5 TABLET ORAL ONCE
Refills: 0 | Status: DISCONTINUED | OUTPATIENT
Start: 2021-04-22 | End: 2021-04-22

## 2021-04-22 RX ORDER — POLYETHYLENE GLYCOL 3350 17 G/17G
17 POWDER, FOR SOLUTION ORAL DAILY
Refills: 0 | Status: DISCONTINUED | OUTPATIENT
Start: 2021-04-22 | End: 2021-04-23

## 2021-04-22 RX ORDER — DIAZEPAM 5 MG
5 TABLET ORAL EVERY 6 HOURS
Refills: 0 | Status: DISCONTINUED | OUTPATIENT
Start: 2021-04-22 | End: 2021-04-23

## 2021-04-22 RX ORDER — KETOROLAC TROMETHAMINE 30 MG/ML
30 SYRINGE (ML) INJECTION ONCE
Refills: 0 | Status: DISCONTINUED | OUTPATIENT
Start: 2021-04-22 | End: 2021-04-22

## 2021-04-22 RX ORDER — GLYCERIN ADULT
1 SUPPOSITORY, RECTAL RECTAL DAILY
Refills: 0 | Status: DISCONTINUED | OUTPATIENT
Start: 2021-04-22 | End: 2021-04-22

## 2021-04-22 RX ORDER — SODIUM CHLORIDE 9 MG/ML
1000 INJECTION INTRAMUSCULAR; INTRAVENOUS; SUBCUTANEOUS
Refills: 0 | Status: DISCONTINUED | OUTPATIENT
Start: 2021-04-22 | End: 2021-04-22

## 2021-04-22 RX ORDER — OXYCODONE HYDROCHLORIDE 5 MG/1
10 TABLET ORAL ONCE
Refills: 0 | Status: DISCONTINUED | OUTPATIENT
Start: 2021-04-22 | End: 2021-04-22

## 2021-04-22 RX ORDER — ONDANSETRON 8 MG/1
4 TABLET, FILM COATED ORAL ONCE
Refills: 0 | Status: DISCONTINUED | OUTPATIENT
Start: 2021-04-22 | End: 2021-04-22

## 2021-04-22 RX ORDER — ACETAMINOPHEN 500 MG
1000 TABLET ORAL ONCE
Refills: 0 | Status: DISCONTINUED | OUTPATIENT
Start: 2021-04-22 | End: 2021-04-22

## 2021-04-22 RX ADMIN — Medication 30 MILLIGRAM(S): at 15:50

## 2021-04-22 RX ADMIN — Medication 5 MILLIGRAM(S): at 20:32

## 2021-04-22 RX ADMIN — Medication 30 MILLIGRAM(S): at 15:42

## 2021-04-22 RX ADMIN — POLYETHYLENE GLYCOL 3350 17 GRAM(S): 17 POWDER, FOR SOLUTION ORAL at 16:19

## 2021-04-22 RX ADMIN — MORPHINE SULFATE 2 MILLIGRAM(S): 50 CAPSULE, EXTENDED RELEASE ORAL at 14:12

## 2021-04-22 RX ADMIN — MORPHINE SULFATE 2 MILLIGRAM(S): 50 CAPSULE, EXTENDED RELEASE ORAL at 06:42

## 2021-04-22 RX ADMIN — LIDOCAINE 2 PATCH: 4 CREAM TOPICAL at 20:32

## 2021-04-22 RX ADMIN — SIMVASTATIN 20 MILLIGRAM(S): 20 TABLET, FILM COATED ORAL at 20:33

## 2021-04-22 RX ADMIN — DICLOFENAC SODIUM 50 MILLIGRAM(S): 75 TABLET, DELAYED RELEASE ORAL at 16:19

## 2021-04-22 RX ADMIN — OXYCODONE HYDROCHLORIDE 10 MILLIGRAM(S): 5 TABLET ORAL at 15:42

## 2021-04-22 RX ADMIN — SODIUM CHLORIDE 125 MILLILITER(S): 9 INJECTION INTRAMUSCULAR; INTRAVENOUS; SUBCUTANEOUS at 15:41

## 2021-04-22 RX ADMIN — DICLOFENAC SODIUM 50 MILLIGRAM(S): 75 TABLET, DELAYED RELEASE ORAL at 00:40

## 2021-04-22 NOTE — PROGRESS NOTE ADULT - SUBJECTIVE AND OBJECTIVE BOX
67 y/o M PMHx significant for CAD s/p stents, Hyperlipidemia, and long standing lower back pain x 30 years s/p multiple epidural injections (last 11/2020) and microdiscectomy, presents to  for further evaluation of severe lower back pain which began after lifting a heavy table 2 weeks ago. He describes his symptoms as 10/10 in intensity back pain, worse with movement, and radiate to his bilateral knees. He reportedly made an appointment with Dr. Miles for an epidural injection in 2 days, but was referred to the ED for further evaluation as this morning the patient noted bilateral lower extremity edema. The patient denies any recent trauma.  CT LS spine => No acute fracture or traumatic subluxation. Mild to moderate disc space narrowing degenerative changes with congenital spinal canal stenosis is present in the lumbar region. US Venous Duplex Bilateral LE => No evidence of deep venous thrombosis in either lower extremity. CXR => Negative chest.  (21 Apr 2021 18:03)    4/2221- pleasant awake, reports he has hx of lumbar spine pain w/ hx of eipdural in November, then lifted something heavy 2 weeks ago and developed severe pain where he fell to his knees from pain. pain has increased over the past several weeks. pt is now sitting up in chair c/o pain w/ any movement and unable to lay flat. otherwise pain is tolerable when he sits still.     REVIEW OF SYSTEMS:    CONSTITUTIONAL: No weakness, fevers or chills  EYES/ENT: No visual changes;  No vertigo or throat pain   NECK: No pain or stiffness  RESPIRATORY: No cough, wheezing, hemoptysis; No shortness of breath  CARDIOVASCULAR: No chest pain or palpitations  GASTROINTESTINAL: No abdominal or epigastric pain. No nausea, vomiting, or hematemesis; No diarrhea or constipation. No melena or hematochezia.  GENITOURINARY: No dysuria, frequency or hematuria  NEUROLOGICAL: No numbness or weakness. denies incontinence or loss of bowel/bladder   SKIN: No itching, burning, rashes, or lesions   All other review of systems is negative unless indicated above        Vital Signs Last 24 Hrs  T(C): 36.5 (22 Apr 2021 08:54), Max: 36.6 (21 Apr 2021 20:57)  T(F): 97.7 (22 Apr 2021 08:54), Max: 97.8 (21 Apr 2021 20:57)  HR: 71 (22 Apr 2021 08:54) (71 - 95)  BP: 130/78 (22 Apr 2021 08:54) (94/66 - 153/88)  BP(mean): --  RR: 17 (22 Apr 2021 08:54) (16 - 20)  SpO2: 98% (22 Apr 2021 08:54) (98% - 99%)    I&O's Summary    21 Apr 2021 07:01  -  22 Apr 2021 07:00  --------------------------------------------------------  IN: 0 mL / OUT: 400 mL / NET: -400 mL      PHYSICAL EXAM:    Constitutional: NAD, awake and alert, well-developed  HEENT: PERR, EOMI, Normal Hearing, MMM  Neck: Soft and supple, No LAD, No JVD  Respiratory: Breath sounds are clear bilaterally, No wheezing, rales or rhonchi  Cardiovascular: S1 and S2, regular rate and rhythm, no Murmurs, gallops or rubs  Gastrointestinal: Bowel Sounds present, soft, nontender, nondistended, no guarding, no rebound  Extremities: No peripheral edema  Vascular: 2+ peripheral pulses  Neurological: A/O x 3, no focal deficits  Musculoskeletal: 5/5 strength b/l upper and lower extremities  Skin: No rashes    MEDICATIONS  (STANDING):  diclofenac 50 milliGRAM(s) Oral three times a day  lidocaine   Patch 2 Patch Transdermal daily  polyethylene glycol 3350 17 Gram(s) Oral daily  simvastatin 20 milliGRAM(s) Oral at bedtime    MEDICATIONS  (PRN):  acetaminophen   Tablet .. 650 milliGRAM(s) Oral every 6 hours PRN Mild Pain (1 - 3)  bisacodyl 5 milliGRAM(s) Oral daily PRN Constipation  cyclobenzaprine 10 milliGRAM(s) Oral three times a day PRN Muscle Spasm  glycerin Suppository - Adult 1 Suppository(s) Rectal daily PRN Constipation  morphine  - Injectable 2 milliGRAM(s) IV Push every 4 hours PRN Severe Pain (7 - 10)  ondansetron Injectable 4 milliGRAM(s) IV Push every 6 hours PRN Nausea  senna 2 Tablet(s) Oral at bedtime PRN Constipation    LABS: All Labs Reviewed:                        16.0   9.42  )-----------( 279      ( 21 Apr 2021 13:21 )             48.5     04-21    137  |  104  |  26<H>  ----------------------------<  92  4.3   |  28  |  1.08    Ca    9.3      21 Apr 2021 13:21    TPro  8.4<H>  /  Alb  4.3  /  TBili  1.1  /  DBili  x   /  AST  15  /  ALT  35  /  AlkPhos  61  04-21    PT/INR - ( 21 Apr 2021 13:21 )   PT: 12.3 sec;   INR: 1.05 ratio         PTT - ( 21 Apr 2021 13:21 )  PTT:29.2 sec  CARDIAC MARKERS ( 21 Apr 2021 13:21 )  <0.015 ng/mL / x     / x     / x     / x        MRI of lumbar spine pending    67 y/o M PMHx significant for CAD s/p stents, Hyperlipidemia, and long standing lower back pain x 30 years s/p multiple epidural injections (last 11/2020) and microdiscectomy, presents to  for further evaluation of severe lower back pain which began after lifting a heavy table 2 weeks ago. He describes his symptoms as 10/10 in intensity back pain, worse with movement, and radiate to his bilateral knees. He reportedly made an appointment with Dr. Miles for an epidural injection in 2 days, but was referred to the ED for further evaluation as this morning the patient noted bilateral lower extremity edema. The patient denies any recent trauma.  CT LS spine => No acute fracture or traumatic subluxation. Mild to moderate disc space narrowing degenerative changes with congenital spinal canal stenosis is present in the lumbar region. US Venous Duplex Bilateral LE => No evidence of deep venous thrombosis in either lower extremity. CXR => Negative chest.  (21 Apr 2021 18:03)    4/22/21- pleasant awake, reports he has hx of lumbar spine pain w/ hx of eipdural in November, then lifted something heavy 2 weeks ago and developed severe pain where he fell to his knees from pain. pain has increased over the past several weeks. pt is now sitting up in chair c/o pain w/ any movement and unable to lay flat. otherwise pain is tolerable when he sits still.     REVIEW OF SYSTEMS:    CONSTITUTIONAL: No weakness, fevers or chills  EYES/ENT: No visual changes;  No vertigo or throat pain   NECK: No pain or stiffness  RESPIRATORY: No cough, wheezing, hemoptysis; No shortness of breath  CARDIOVASCULAR: No chest pain or palpitations  GASTROINTESTINAL: No abdominal or epigastric pain. No nausea, vomiting, or hematemesis; No diarrhea or constipation. No melena or hematochezia.  GENITOURINARY: No dysuria, frequency or hematuria  NEUROLOGICAL: No numbness or weakness. denies incontinence or loss of bowel/bladder   SKIN: No itching, burning, rashes, or lesions   All other review of systems is negative unless indicated above        Vital Signs Last 24 Hrs  T(C): 36.5 (22 Apr 2021 08:54), Max: 36.6 (21 Apr 2021 20:57)  T(F): 97.7 (22 Apr 2021 08:54), Max: 97.8 (21 Apr 2021 20:57)  HR: 71 (22 Apr 2021 08:54) (71 - 95)  BP: 130/78 (22 Apr 2021 08:54) (94/66 - 153/88)  BP(mean): --  RR: 17 (22 Apr 2021 08:54) (16 - 20)  SpO2: 98% (22 Apr 2021 08:54) (98% - 99%)    I&O's Summary    21 Apr 2021 07:01  -  22 Apr 2021 07:00  --------------------------------------------------------  IN: 0 mL / OUT: 400 mL / NET: -400 mL      PHYSICAL EXAM:    Constitutional: NAD, awake and alert, well-developed  HEENT: PERR, EOMI, Normal Hearing, MMM  Neck: Soft and supple, No LAD, No JVD  Respiratory: Breath sounds are clear bilaterally, No wheezing, rales or rhonchi  Cardiovascular: S1 and S2, regular rate and rhythm, no Murmurs, gallops or rubs  Gastrointestinal: Bowel Sounds present, soft, nontender, nondistended, no guarding, no rebound  Extremities: No peripheral edema  Vascular: 2+ peripheral pulses  Neurological: A/O x 3, no focal deficits  Musculoskeletal: 5/5 strength b/l upper and lower extremities  Skin: No rashes    MEDICATIONS  (STANDING):  diclofenac 50 milliGRAM(s) Oral three times a day  lidocaine   Patch 2 Patch Transdermal daily  polyethylene glycol 3350 17 Gram(s) Oral daily  simvastatin 20 milliGRAM(s) Oral at bedtime    MEDICATIONS  (PRN):  acetaminophen   Tablet .. 650 milliGRAM(s) Oral every 6 hours PRN Mild Pain (1 - 3)  bisacodyl 5 milliGRAM(s) Oral daily PRN Constipation  cyclobenzaprine 10 milliGRAM(s) Oral three times a day PRN Muscle Spasm  glycerin Suppository - Adult 1 Suppository(s) Rectal daily PRN Constipation  morphine  - Injectable 2 milliGRAM(s) IV Push every 4 hours PRN Severe Pain (7 - 10)  ondansetron Injectable 4 milliGRAM(s) IV Push every 6 hours PRN Nausea  senna 2 Tablet(s) Oral at bedtime PRN Constipation    LABS: All Labs Reviewed:                        16.0   9.42  )-----------( 279      ( 21 Apr 2021 13:21 )             48.5     04-21    137  |  104  |  26<H>  ----------------------------<  92  4.3   |  28  |  1.08    Ca    9.3      21 Apr 2021 13:21    TPro  8.4<H>  /  Alb  4.3  /  TBili  1.1  /  DBili  x   /  AST  15  /  ALT  35  /  AlkPhos  61  04-21    PT/INR - ( 21 Apr 2021 13:21 )   PT: 12.3 sec;   INR: 1.05 ratio         PTT - ( 21 Apr 2021 13:21 )  PTT:29.2 sec  CARDIAC MARKERS ( 21 Apr 2021 13:21 )  <0.015 ng/mL / x     / x     / x     / x        MRI of lumbar spine pending

## 2021-04-22 NOTE — PROGRESS NOTE ADULT - SUBJECTIVE AND OBJECTIVE BOX
HPI:  65 yo male PMH long standing lower back pain x 30 yrs s/p multiple ED injections (last 11/2020) and microdiscectomy, CAD s/p stents, HLD presents to the ED c/o back pain. As per pt, he was trying to lift a glass table top 2 weeks ago and "felt it happen". He describes spasm type pain, worse with movement, associated b/l radiculopathy to knees. Pt states his pain is getting worse over the last couple of days. Pt has an appointment with Dr. Miles for a epidural injection in 2 days. This morning pt noted b/l LE edema so he called his doctor who advised him to come in to the ED. LE dopplers neg for clot. CT L spine sig for degenerative changes / stenosis. At the time of my exam pt appears to be in mild distress 2nd to pain. Pt denies HA, visual changes, focal numb / ting / weak, word finding difficulty, neck stiffness, photophobia.     4/22- Pt seen and examined, had MRI with sedation, continued back pain. Pain radiates around his back down to his groin and sown to his knees bilaterally.  Pain 10/10 when the patient tries to change position, pt unable to lay flat, unable to ambulate without assistance. Pt denies any difficulty with urination, denies groin numbness.    Vital Signs Last 24 Hrs  T(C): 36.7 (22 Apr 2021 16:34), Max: 36.7 (22 Apr 2021 16:34)  T(F): 98 (22 Apr 2021 16:34), Max: 98 (22 Apr 2021 16:34)  HR: 67 (22 Apr 2021 16:34) (67 - 95)  BP: 148/84 (22 Apr 2021 16:34) (94/66 - 148/84)  RR: 18 (22 Apr 2021 16:34) (12 - 20)  SpO2: 99% (22 Apr 2021 16:34) (98% - 100%)    MEDICATIONS  (STANDING):  diclofenac 50 milliGRAM(s) Oral three times a day  lidocaine   Patch 2 Patch Transdermal daily  polyethylene glycol 3350 17 Gram(s) Oral daily  simvastatin 20 milliGRAM(s) Oral at bedtime    MEDICATIONS  (PRN):  acetaminophen   Tablet .. 650 milliGRAM(s) Oral every 6 hours PRN Mild Pain (1 - 3)  bisacodyl 5 milliGRAM(s) Oral daily PRN Constipation  cyclobenzaprine 10 milliGRAM(s) Oral three times a day PRN Muscle Spasm  morphine  - Injectable 2 milliGRAM(s) IV Push every 4 hours PRN Severe Pain (7 - 10)  ondansetron Injectable 4 milliGRAM(s) IV Push every 6 hours PRN Nausea  senna 2 Tablet(s) Oral at bedtime PRN Constipation    ROS: pertinent positives in HPI, all other ROS were reviewed and are negative     PHYSICAL EXAM:  Constitutional: Awake / alert, sitting   HEENT: PERRLA, EOMI  Neck: Supple  Respiratory: Breath sounds are clear bilaterally  Cardiovascular: S1 and S2, regular rhythm  Gastrointestinal: Soft, NT/ND  Extremities: 1+ b/l LE edema  Vascular: No carotid Bruit  Musculoskeletal: no joint swelling/tenderness, no abnormal movements  Skin: No rashes    Neurological Exam:  HF: A x O x 3, appropriately interactive, normal affect, speech fluent, no aphasia or paraphasic errors. Naming /repetition intact   CN: PERRL, EOMI, facial sensation normal, no NLFD, tongue midline  Motor: No pronator drift, Strength 5/5 in all 4 ext, normal bulk and tone, no tremor, rigidity or bradykinesia  Sens: Intact to light touch  Reflexes: Symmetric and normal, downgoing toes b/l  Coord:  No FNFA, dysmetria, AARON intact   Gait/Balance: Cannot test      LABS:                         16.0   9.42  )-----------( 279      ( 21 Apr 2021 13:21 )             48.5     04-21    137  |  104  |  26<H>  ----------------------------<  92  4.3   |  28  |  1.08    Ca    9.3      21 Apr 2021 13:21    TPro  8.4<H>  /  Alb  4.3  /  TBili  1.1  /  DBili  x   /  AST  15  /  ALT  35  /  AlkPhos  61  04-21    LIVER FUNCTIONS - ( 21 Apr 2021 13:21 )  Alb: 4.3 g/dL / Pro: 8.4 gm/dL / ALK PHOS: 61 U/L / ALT: 35 U/L / AST: 15 U/L / GGT: x           RADIOLOGY:  < from: MR Lumbar Spine No Cont (04.22.21 @ 15:20) >  Lumbar vertebral alignment is preserved.  Lumbar vertebralbody heights are maintained.  Marrow signal intensity within lumbar vertebral bodies and posterior elements is unremarkable.  No osseous expansion, epidural disease or paraspinal abnormality is found.    Lumbar intervertebral discs show disc degeneration at T12-L1 through L5-S1 with loss of disc height and signal within the nucleus pulposus. Disc bulges are noted at these levels which flatten the ventral thecal sac and narrow the BILATERAL neural foramina. Mild central stenosis at L1-2, L2-3, moderate central stenosis at L3-4 on a degenerative basis due to disc bulge, facet osteophytic hypertrophy and redundancy of ligamentum flavum. Tiny central disc herniation noted at L1-2, small LEFT paracentral disc herniation noted at L3-4, tiny centraldisc herniation with annular tear noted at L4-5 and small RIGHT paracentral disc herniation noted at L5-S1 which indents the ventral thecal sac.    The distal cord maintains intact morphology.  Distal cord signal intensity is preserved.  The conus isnormally positioned at the L1 level.  Nerve roots of the cauda equina appear intact.    IMPRESSION:  Multilevel disc degeneration at T12-L1 through L5-S1 with disc bulges that flatten the ventral thecal sac and narrow the BILATERAL neural foramina.Mild central stenosis at L1-2, L2-3, moderate central stenosis at L3-4 on a degenerative basis. Tiny central disc herniation noted at L1-2, small LEFT paracentral disc herniation noted at L3-4, tiny central disc herniation with annular tear noted at L4-5 and small RIGHT paracentral disc herniation noted at L5-S1 which indents the ventral thecal sac.

## 2021-04-23 VITALS
DIASTOLIC BLOOD PRESSURE: 84 MMHG | OXYGEN SATURATION: 99 % | TEMPERATURE: 97 F | SYSTOLIC BLOOD PRESSURE: 137 MMHG | HEART RATE: 75 BPM | RESPIRATION RATE: 18 BRPM

## 2021-04-23 PROCEDURE — 99239 HOSP IP/OBS DSCHRG MGMT >30: CPT

## 2021-04-23 PROCEDURE — 99232 SBSQ HOSP IP/OBS MODERATE 35: CPT

## 2021-04-23 RX ORDER — DIAZEPAM 5 MG
1 TABLET ORAL
Qty: 20 | Refills: 0
Start: 2021-04-23 | End: 2021-04-27

## 2021-04-23 RX ORDER — LIDOCAINE 4 G/100G
1 CREAM TOPICAL
Qty: 10 | Refills: 0
Start: 2021-04-23 | End: 2021-05-02

## 2021-04-23 RX ORDER — SENNA PLUS 8.6 MG/1
2 TABLET ORAL
Qty: 0 | Refills: 0 | DISCHARGE
Start: 2021-04-23

## 2021-04-23 RX ORDER — POLYETHYLENE GLYCOL 3350 17 G/17G
17 POWDER, FOR SOLUTION ORAL
Qty: 0 | Refills: 0 | DISCHARGE
Start: 2021-04-23

## 2021-04-23 RX ORDER — CYCLOBENZAPRINE HYDROCHLORIDE 10 MG/1
1 TABLET, FILM COATED ORAL
Qty: 21 | Refills: 0
Start: 2021-04-23 | End: 2021-04-29

## 2021-04-23 RX ORDER — CYCLOBENZAPRINE HYDROCHLORIDE 10 MG/1
1 TABLET, FILM COATED ORAL
Qty: 0 | Refills: 0 | DISCHARGE

## 2021-04-23 RX ADMIN — CYCLOBENZAPRINE HYDROCHLORIDE 10 MILLIGRAM(S): 10 TABLET, FILM COATED ORAL at 10:05

## 2021-04-23 RX ADMIN — LIDOCAINE 2 PATCH: 4 CREAM TOPICAL at 05:42

## 2021-04-23 RX ADMIN — Medication 5 MILLIGRAM(S): at 11:48

## 2021-04-23 RX ADMIN — LIDOCAINE 2 PATCH: 4 CREAM TOPICAL at 11:48

## 2021-04-23 RX ADMIN — Medication 5 MILLIGRAM(S): at 05:44

## 2021-04-23 RX ADMIN — POLYETHYLENE GLYCOL 3350 17 GRAM(S): 17 POWDER, FOR SOLUTION ORAL at 10:05

## 2021-04-23 NOTE — DISCHARGE NOTE PROVIDER - NSDCMRMEDTOKEN_GEN_ALL_CORE_FT
bisacodyl 5 mg oral delayed release tablet: 1 tab(s) orally once a day, As needed, Constipation  cyclobenzaprine 10 mg oral tablet: 1 tab(s) orally 3 times a day, As needed, Muscle Spasm MDD:3  diclofenac potassium 50 mg oral tablet: 1 tab(s) orally 3 times a day  Lidoderm 5% topical film: Apply topically to affected area once a day   Medrol Dosepak 4 mg oral tablet: Take as directed  ***Course Completed***  polyethylene glycol 3350 oral powder for reconstitution: 17 gram(s) orally once a day  senna oral tablet: 2 tab(s) orally once a day (at bedtime), As needed, Constipation  Valium 5 mg oral tablet: 1 tab(s) orally every 6 hours, As needed, Muscle spasm MDD:3  Zocor 20 mg oral tablet: 1 tab(s) orally once a day (at bedtime)

## 2021-04-23 NOTE — DISCHARGE NOTE PROVIDER - NSDCCAREPROVSEEN_GEN_ALL_CORE_FT
Linus Gaines - internal medicine   Boris Ellis - neurosurgery   Tabitha Guzman - nurse practitioner

## 2021-04-23 NOTE — PROGRESS NOTE ADULT - SUBJECTIVE AND OBJECTIVE BOX
HPI:  65 yo male PMH long standing lower back pain x 30 yrs s/p multiple ED injections (last 11/2020) and microdiscectomy, CAD s/p stents, HLD presents to the ED c/o back pain. As per pt, he was trying to lift a glass table top 2 weeks ago and "felt it happen". He describes spasm type pain, worse with movement, associated b/l radiculopathy to knees. Pt states his pain is getting worse over the last couple of days. Pt has an appointment with Dr. Miles for a epidural injection in 2 days. This morning pt noted b/l LE edema so he called his doctor who advised him to come in to the ED. LE dopplers neg for clot. CT L spine sig for degenerative changes / stenosis. At the time of my exam pt appears to be in mild distress 2nd to pain. Pt denies HA, visual changes, focal numb / ting / weak, word finding difficulty, neck stiffness, photophobia.     4/22- Pt seen and examined, had MRI with sedation, continued back pain. Pain radiates around his back down to his groin and sown to his knees bilaterally.  Pain 10/10 when the patient tries to change position, pt unable to lay flat, unable to ambulate without assistance. Pt denies any difficulty with urination, denies groin numbness.    4/23- Patient seen and examined, back pain mildly improved with valium. Able to ambulate to bathroom without assistance. No new complaints.    Vital Signs Last 24 Hrs  T(C): 36.3 (23 Apr 2021 08:54), Max: 36.7 (22 Apr 2021 16:34)  T(F): 97.3 (23 Apr 2021 08:54), Max: 98 (22 Apr 2021 16:34)  HR: 75 (23 Apr 2021 08:54) (67 - 79)  BP: 137/84 (23 Apr 2021 08:54) (124/71 - 148/84)  BP(mean): --  RR: 18 (23 Apr 2021 08:54) (12 - 20)  SpO2: 99% (23 Apr 2021 08:54) (97% - 100%)    MEDICATIONS  (STANDING):  lidocaine   Patch 2 Patch Transdermal daily  polyethylene glycol 3350 17 Gram(s) Oral daily  simvastatin 20 milliGRAM(s) Oral at bedtime    MEDICATIONS  (PRN):  acetaminophen   Tablet .. 650 milliGRAM(s) Oral every 6 hours PRN Mild Pain (1 - 3)  bisacodyl 5 milliGRAM(s) Oral daily PRN Constipation  cyclobenzaprine 10 milliGRAM(s) Oral three times a day PRN Muscle Spasm  diazepam    Tablet 5 milliGRAM(s) Oral every 6 hours PRN Muscle spasm  morphine  - Injectable 2 milliGRAM(s) IV Push every 4 hours PRN Severe Pain (7 - 10)  ondansetron Injectable 4 milliGRAM(s) IV Push every 6 hours PRN Nausea  senna 2 Tablet(s) Oral at bedtime PRN Constipation      PHYSICAL EXAM:  Constitutional: Awake / alert, sitting   HEENT: PERRLA, EOMI  Neck: Supple  Respiratory: Breath sounds are clear bilaterally  Cardiovascular: S1 and S2, regular rhythm  Gastrointestinal: Soft, NT/ND  Extremities: 1+ b/l LE edema  Vascular: No carotid Bruit  Musculoskeletal: no joint swelling/tenderness, no abnormal movements  Skin: No rashes    Neurological Exam:  HF: A x O x 3, appropriately interactive, normal affect, speech fluent, no aphasia or paraphasic errors. Naming /repetition intact   CN: PERRL, EOMI, facial sensation normal, no NLFD, tongue midline  Motor: No pronator drift, Strength 5/5 in all 4 ext, normal bulk and tone, no tremor, rigidity or bradykinesia  Sens: Intact to light touch  Reflexes: Symmetric and normal, downgoing toes b/l neg SLR  Coord:  No FNFA, dysmetria, AARON intact   Gait/Balance: Cannot test          LABS:                         16.0   9.42  )-----------( 279      ( 21 Apr 2021 13:21 )             48.5     04-21    137  |  104  |  26<H>  ----------------------------<  92  4.3   |  28  |  1.08    Ca    9.3      21 Apr 2021 13:21    TPro  8.4<H>  /  Alb  4.3  /  TBili  1.1  /  DBili  x   /  AST  15  /  ALT  35  /  AlkPhos  61  04-21    LIVER FUNCTIONS - ( 21 Apr 2021 13:21 )  Alb: 4.3 g/dL / Pro: 8.4 gm/dL / ALK PHOS: 61 U/L / ALT: 35 U/L / AST: 15 U/L / GGT: x

## 2021-04-23 NOTE — DISCHARGE NOTE NURSING/CASE MANAGEMENT/SOCIAL WORK - PATIENT PORTAL LINK FT
You can access the FollowMyHealth Patient Portal offered by [facility] by registering at the following website: [email or web address]. By joining [employer]’s FollowMyHealth portal, you will also be able to view your health information using other applications (apps) compatible with our system.

## 2021-04-23 NOTE — DISCHARGE NOTE PROVIDER - NSTOBACCOUSAGEY/N_GEN_A_CS
"Chief Complaint   Patient presents with     Infection     On the left 4th toe started last week Tuesday       Initial /90 (BP Location: Left arm, Patient Position: Chair, Cuff Size: Adult Regular)  Pulse 52  Temp 98.4  F (36.9  C) (Oral)  Wt 201 lb 6.4 oz (91.4 kg)  SpO2 99%  BMI 28.09 kg/m2 Estimated body mass index is 28.09 kg/(m^2) as calculated from the following:    Height as of 8/16/17: 5' 11\" (1.803 m).    Weight as of this encounter: 201 lb 6.4 oz (91.4 kg).  Medication Reconciliation: complete   Osiris Edwards MA        "
No

## 2021-04-23 NOTE — DISCHARGE NOTE PROVIDER - NSDCCPCAREPLAN_GEN_ALL_CORE_FT
PRINCIPAL DISCHARGE DIAGNOSIS  Diagnosis: Intractable back pain  Assessment and Plan of Treatment:   Acute low back pain is sudden discomfort that lasts up to 6 weeks and makes activity difficult.  Monitor for the following signs/symptoms and contact your primary care provider if they occur or go to the ER if they are severe  •You have severe pain.  •You have sudden stiffness and heaviness on both buttocks down to both legs.  •You have numbness or weakness in one leg, or pain in both legs.  •You have numbness in your genital area or across your lower back.  •You cannot control your urine or bowel movements.  •You have a fever.  •You have pain at night or when you rest.  •Your pain does not get better with treatment.  •You have pain that worsens when you cough or sneeze.  •You suddenly feel something pop or snap in your back.  •You have questions or concerns about your condition or care.  Medicines: You may need any of the following:  •NSAIDs, such as ibuprofen, help decrease swelling, pain, and fever. This medicine is available with or without a doctor's order. NSAIDs can cause stomach bleeding or kidney problems in certain people. If you take blood thinner medicine, always ask your healthcare provider if NSAIDs are safe for you. Always read the medicine label and follow directions.  •Acetaminophen decreases pain and fever. It is available without a doctor's order. Ask how much to take and how often to take it. Follow directions. Read the labels of all other medicines you are using to see if they also contain acetaminophen, or ask your doctor or pharmacist. Acetaminophen can cause liver damage if not taken correctly. Do not use more than 4 grams (4,000 milligrams) total of acetaminophen in one day.   •Muscle relaxers decrease pain by relaxing the muscles in your lower spine.  •Prescription pain medicine may be given. Ask your healthcare provider how to take this medicine safely. Some prescription pain medicines contain acetaminophen. Do not take other medicines that contain acetaminophen without talking to your healthcare provider. Too much acetaminophen may cause liver damage

## 2021-04-23 NOTE — PROGRESS NOTE ADULT - ASSESSMENT
65 yo male PMH long standing lower back pain x 30 yrs s/p multiple ED injections (last 11/2020) and microdiscectomy, CAD s/p stents, HLD presents to the ED c/o back pain. As per pt, he was trying to lift a glass table top 2 weeks ago and "felt it happen". He describes spasm type pain, worse with movement, associated b/l radiculopathy to knees. Pt states his pain is getting worse over the last couple of days. This morning pt noted b/l LE edema so he called his doctor who advised him to come in to the ED. LE dopplers neg for clot. CT L spine sig for degenerative changes / stenosis.    - MRI reviewed with Dr. Ellis, moderate stenosis @ L1/2 and left sided stenosis at L3/4, significant amount of muscle spasm  - Dr. Ellis was able to review last MRI for [facility], no significant change   - No acute neurosurgical intervention   - Consider change in medication, adding valium for muscle spasm   - Consider consult Dr. Lenny Beaulieu for pain management     Discussed with Dr Ellis
65 yo male PMH long standing lower back pain x 30 yrs s/p multiple ED injections (last 11/2020) and microdiscectomy, CAD s/p stents, HLD presents to the ED c/o back pain. As per pt, he was trying to lift a glass table top 2 weeks ago and "felt it happen". He describes spasm type pain, worse with movement, associated b/l radiculopathy to knees. Pt states his pain is getting worse over the last couple of days. This morning pt noted b/l LE edema so he called his doctor who advised him to come in to the ED. LE dopplers neg for clot. CT L spine sig for degenerative changes / stenosis.    - No acute neurosurgical intervention   - MRI reviewed with Dr. Ellis, moderate stenosis @ L1/2 and left sided stenosis at L3/4, significant amount of muscle spasm  - Dr. Ellis was able to review last MRI for [facility], no significant change   - Continue flexeril and valium for muscle spasm   - Patient given contact info for follow up with Dr. Lenny Beaulieu for pain management     Discussed with Dr Ellis
65 y/o M PMHx significant for CAD s/p stents, Hyperlipidemia, and long standing lower back pain x 30 years s/p multiple epidural injections (last 11/2020) and microdiscectomy, presents to  for further evaluation of severe lower back pain which began after lifting a heavy table 2 weeks ago. He describes his symptoms as 10/10 in intensity back pain, worse with movement, and radiate to his bilateral knees. He reportedly made an appointment with Dr. Miles for an epidural injection in 2 days, but was referred to the ED for further evaluation as this morning the patient noted bilateral lower extremity edema. The patient denies any recent trauma.  CT LS spine => No acute fracture or traumatic subluxation. Mild to moderate disc space narrowing degenerative changes with congenital spinal canal stenosis is present in the lumbar region. US Venous Duplex Bilateral LE => No evidence of deep venous thrombosis in either lower extremity. CXR => Negative chest.    #Severe Lower Back Pain  ~f/u w/ Ortho-Spine  ~per Ortho-spine no neurosurgical intervention   ~f/u MRI L spine, will require sedation preprocedure. NPO   ~cont. Pain management  ~ PT consulted     #Hyperlipidemia  ~cont. statin therapy with Simvastatin 20mg po qhs    #Vte ppx  ~IMPROVE Vte Risk Score is 1  ~cont. SCDs for now

## 2021-04-23 NOTE — DISCHARGE NOTE PROVIDER - NSDCFUADDINST_GEN_ALL_CORE_FT
the medications that have been prescribed can make you very drowsy and sleepy.  please do not drink or drive while you're taking these medications.    the medications that have been prescribed can make you very drowsy and sleepy.  please do not drink or drive while you're taking these medications.   Suppository- Glycerin   Laxative - Miralax   Stool Softner- Senna

## 2021-04-23 NOTE — DISCHARGE NOTE PROVIDER - CARE PROVIDER_API CALL
Boris Ellis; PhD)  Neurosurgery  [address], [address] [place] [address]  Phone: [phone]  Fax: [phone]  Follow Up Time: 1 week

## 2021-04-23 NOTE — DISCHARGE NOTE PROVIDER - HOSPITAL COURSE
pt is a 65 y/o male w/ pmhx of CAD s/p stents, Hyperlipidemia, and long standing lower back pain x 30 years s/p multiple epidural injections (last 2020) and microdiscectomy, presented on  for eval of severe back pain for 2 week after moving a table increasing in severity to where he was unable to walk or move without severe pain. pt evaled by neuro spine, MRI negative for herniation or compression when compared to previous MRI in november . pt evaled by Dr. Ellis, no surgical intervention required, pain is likely musculoskeletal as pain has improved with PO valium. pt is clear for dc w/ PO analgesics and muscle relaxors.     Vital Signs Last 24 Hrs  T(C): 36.3 (2021 08:54), Max: 36.7 (2021 16:34)  T(F): 97.3 (2021 08:54), Max: 98 (2021 16:34)  HR: 75 (2021 08:54) (67 - 79)  BP: 137/84 (2021 08:54) (124/71 - 148/84)  BP(mean): --  RR: 18 (2021 08:54) (12 - 20)  SpO2: 99% (2021 08:54) (97% - 100%) --- room air       LABS                         16.0   9.42  )-----------( 279      ( 2021 13:21 )             48.5     04-21    137  |  104  |  26<H>  ----------------------------<  92  4.3   |  28  |  1.08    Ca    9.3      2021 13:21    TPro  8.4<H>  /  Alb  4.3  /  TBili  1.1  /  DBili  x   /  AST  15  /  ALT  35  /  AlkPhos  61  04-21    CARDIAC MARKERS ( 2021 13:21 )  <0.015 ng/mL / x     / x     / x     / x          LIVER FUNCTIONS - ( 2021 13:21 )  Alb: 4.3 g/dL / Pro: 8.4 gm/dL / ALK PHOS: 61 U/L / ALT: 35 U/L / AST: 15 U/L / GGT: x           PT/INR - ( 2021 13:21 )   PT: 12.3 sec;   INR: 1.05 ratio         PTT - ( 2021 13:21 )  PTT:29.2 sec  Urinalysis Basic - ( 2021 18:19 )    Color: Yellow / Appearance: Clear / S.025 / pH: x  Gluc: x / Ketone: Trace  / Bili: Negative / Urobili: Negative mg/dL   Blood: x / Protein: 15 mg/dL / Nitrite: Negative   Leuk Esterase: Negative / RBC: 3-5 /HPF / WBC 6-10   Sq Epi: x / Non Sq Epi: Few / Bacteria: Few    Constitutional: NAD, awake and alert, well-developed  HEENT: PERR, EOMI, Normal Hearing, MMM  Neck: Soft and supple, No LAD, No JVD  Respiratory: Breath sounds are clear bilaterally, No wheezing, rales or rhonchi  Cardiovascular: S1 and S2, regular rate and rhythm, no Murmurs, gallops or rubs  Gastrointestinal: Bowel Sounds present, soft, nontender, nondistended, no guarding, no rebound  Extremities: No peripheral edema  Vascular: 2+ peripheral pulses  Neurological: A/O x 3, no focal deficits  Musculoskeletal: 5/5 strength b/l upper and lower extremities  Skin: No rashes    < from: MR Lumbar Spine No Cont (21 @ 15:20) >    IMPRESSION:  Multilevel disc degeneration at T12-L1 through L5-S1 with disc bulges that flatten the ventral thecal sac and narrow the BILATERAL neural foramina.Mild central stenosis at L1-2, L2-3, moderate central stenosis at L3-4 on a degenerative basis. Tiny central disc herniation noted at L1-2, small LEFT paracentral disc herniation noted at L3-4, tiny central disc herniation with annular tear noted at L4-5 and small RIGHT paracentral disc herniation noted at L5-S1 which indents the ventral thecal sac.      LINDA PARRISH MD; Attending Radiologist  This document has been electronically signed. 2021  4:23PM    < end of copied text >      A&P   #Severe Lower Back Pain  ~f/u w/ Ortho-Spine upon Dc   ~per Ortho-spine no neurosurgical intervention   ~f/u MRI L spine, will require sedation preprocedure. NPO   ~cont. Pain management - dc on flexeril, valium   ~ PT consulted     #Hyperlipidemia  ~cont. statin therapy with Simvastatin 20mg po qhs upon DC     #Vte ppx  ~IMPROVE Vte Risk Score is 1  ~s/p SCDs     time spent preparing DC 32 mins   above plan discussed with pt, bedside RN and MD Gaines CC: Back pain  HPI:  65 y/o male w/ pmhx of CAD s/p stents, Hyperlipidemia, and long standing lower back pain x 30 years s/p multiple epidural injections (last 2020) and microdiscectomy, presented on  for eval of severe back pain for 2 week after moving a table increasing in severity to where he was unable to walk or move without severe pain. pt evaled by neuro spine, MRI negative for herniation or compression when compared to previous MRI in november . pt evaled by Dr. Ellis, no surgical intervention required, pain is likely musculoskeletal as pain has improved with PO valium. pt is clear for dc w/ PO analgesics and muscle relaxors.     Vital Signs Last 24 Hrs  T(C): 36.3 (2021 08:54), Max: 36.7 (2021 16:34)  T(F): 97.3 (2021 08:54), Max: 98 (2021 16:34)  HR: 75 (2021 08:54) (67 - 79)  BP: 137/84 (2021 08:54) (124/71 - 148/84)  BP(mean): --  RR: 18 (2021 08:54) (12 - 20)  SpO2: 99% (2021 08:54) (97% - 100%) --- room air       PHYSICAL EXAM:  Constitutional: NAD, awake and alert, well-developed  HEENT: PERR, EOMI, Normal Hearing, MMM  Neck: Soft and supple, No LAD, No JVD  Respiratory: Breath sounds are clear bilaterally, No wheezing, rales or rhonchi  Cardiovascular: S1 and S2, regular rate and rhythm, no Murmurs, gallops or rubs  Gastrointestinal: Bowel Sounds present, soft, nontender, nondistended, no guarding, no rebound  Extremities: No peripheral edema  Vascular: 2+ peripheral pulses  Neurological: A/O x 3, no focal deficits  Musculoskeletal: 5/5 strength b/l upper and lower extremities  Skin: No rashes  LABS                         16.0   9.42  )-----------( 279      ( 2021 13:21 )             48.5     04-21    137  |  104  |  26<H>  ----------------------------<  92  4.3   |  28  |  1.08    Ca    9.3      2021 13:21    TPro  8.4<H>  /  Alb  4.3  /  TBili  1.1  /  DBili  x   /  AST  15  /  ALT  35  /  AlkPhos  61  04-21    CARDIAC MARKERS ( 2021 13:21 )  <0.015 ng/mL / x     / x     / x     / x          LIVER FUNCTIONS - ( 2021 13:21 )  Alb: 4.3 g/dL / Pro: 8.4 gm/dL / ALK PHOS: 61 U/L / ALT: 35 U/L / AST: 15 U/L / GGT: x           PT/INR - ( 2021 13:21 )   PT: 12.3 sec;   INR: 1.05 ratio         PTT - ( 2021 13:21 )  PTT:29.2 sec  Urinalysis Basic - ( 2021 18:19 )    Color: Yellow / Appearance: Clear / S.025 / pH: x  Gluc: x / Ketone: Trace  / Bili: Negative / Urobili: Negative mg/dL   Blood: x / Protein: 15 mg/dL / Nitrite: Negative   Leuk Esterase: Negative / RBC: 3-5 /HPF / WBC 6-10   Sq Epi: x / Non Sq Epi: Few / Bacteria: Few    Constitutional: NAD, awake and alert, well-developed  HEENT: PERR, EOMI, Normal Hearing, MMM  Neck: Soft and supple, No LAD, No JVD  Respiratory: Breath sounds are clear bilaterally, No wheezing, rales or rhonchi  Cardiovascular: S1 and S2, regular rate and rhythm, no Murmurs, gallops or rubs  Gastrointestinal: Bowel Sounds present, soft, nontender, nondistended, no guarding, no rebound  Extremities: No peripheral edema  Vascular: 2+ peripheral pulses  Neurological: A/O x 3, no focal deficits  Musculoskeletal: 5/5 strength b/l upper and lower extremities  Skin: No rashes    < from: MR Lumbar Spine No Cont (21 @ 15:20) >    IMPRESSION:  Multilevel disc degeneration at T12-L1 through L5-S1 with disc bulges that flatten the ventral thecal sac and narrow the BILATERAL neural foramina.Mild central stenosis at L1-2, L2-3, moderate central stenosis at L3-4 on a degenerative basis. Tiny central disc herniation noted at L1-2, small LEFT paracentral disc herniation noted at L3-4, tiny central disc herniation with annular tear noted at L4-5 and small RIGHT paracentral disc herniation noted at L5-S1 which indents the ventral thecal sac.      LINDA PARRISH MD; Attending Radiologist  This document has been electronically signed. 2021  4:23PM    A&P   #Severe Lower Back Pain  ~f/u w/ Ortho-Spine upon Dc   ~per Ortho-spine no neurosurgical intervention   ~f/u MRI L spine, will require sedation preprocedure. NPO   ~cont. Pain management - dc on flexeril, valium   ~ PT     #Hyperlipidemia  ~cont. statin therapy with Simvastatin 20mg po qhs upon DC     time spent preparing DC 32 mins   above plan discussed with pt, bedside RN and MD Gaines     Attending Attestation:  I agree with the assessment and plan of NP Thomas as stated and discussed.

## 2021-04-28 DIAGNOSIS — M48.061 SPINAL STENOSIS, LUMBAR REGION WITHOUT NEUROGENIC CLAUDICATION: ICD-10-CM

## 2021-04-28 DIAGNOSIS — R60.9 EDEMA, UNSPECIFIED: ICD-10-CM

## 2021-04-28 DIAGNOSIS — I25.10 ATHEROSCLEROTIC HEART DISEASE OF NATIVE CORONARY ARTERY WITHOUT ANGINA PECTORIS: ICD-10-CM

## 2021-04-28 DIAGNOSIS — M47.26 OTHER SPONDYLOSIS WITH RADICULOPATHY, LUMBAR REGION: ICD-10-CM

## 2021-04-28 DIAGNOSIS — M62.830 MUSCLE SPASM OF BACK: ICD-10-CM

## 2021-04-28 DIAGNOSIS — Z79.899 OTHER LONG TERM (CURRENT) DRUG THERAPY: ICD-10-CM

## 2021-04-28 DIAGNOSIS — Z95.5 PRESENCE OF CORONARY ANGIOPLASTY IMPLANT AND GRAFT: ICD-10-CM

## 2021-04-28 DIAGNOSIS — Z91.018 ALLERGY TO OTHER FOODS: ICD-10-CM

## 2021-04-28 DIAGNOSIS — E78.5 HYPERLIPIDEMIA, UNSPECIFIED: ICD-10-CM

## 2021-05-18 PROBLEM — Z00.00 ENCOUNTER FOR PREVENTIVE HEALTH EXAMINATION: Status: ACTIVE | Noted: 2021-05-18

## 2022-02-10 NOTE — ASU PATIENT PROFILE, ADULT - PATIENT'S HEIGHT AND WEIGHT RECORDED IN THE VITAL SIGNS FLOWSHEET
yes Suturegard Body: The suture ends were repeatedly re-tightened and re-clamped to achieve the desired tissue expansion.

## 2022-03-16 NOTE — PATIENT PROFILE ADULT - TOBACCO USE
Patient due for Medicare Annual  No answer left message to return call to [phone]  Never smoker

## 2022-06-24 ENCOUNTER — NON-APPOINTMENT (OUTPATIENT)
Age: 67
End: 2022-06-24

## 2023-01-19 NOTE — ED STATDOCS - RESPIRATORY, MLM
RT called to assess patient, sats were dropping at that time. Writer had patient cough but patient has a weak non productive cough. Duoneb was administered at that time. Patient did not respond. ABG drawn off arterial line. Per results High flow system 45L 100% was started sats increased. Will continue to follow closely.        pH Arterial      7.55      PCO2 Arterial      28      PO2 Arterial      59      HCO3 Arterial      25      BASE EXCESS / DEFICIT, ARTERIAL - POINT OF CARE      2      O2 SAT Arterial      94             breath sounds clear and equal bilaterally.

## 2024-09-18 ENCOUNTER — NON-APPOINTMENT (OUTPATIENT)
Age: 69
End: 2024-09-18

## 2024-09-25 ENCOUNTER — NON-APPOINTMENT (OUTPATIENT)
Age: 69
End: 2024-09-25

## 2025-02-06 ENCOUNTER — NON-APPOINTMENT (OUTPATIENT)
Age: 70
End: 2025-02-06

## 2025-03-14 ENCOUNTER — NON-APPOINTMENT (OUTPATIENT)
Age: 70
End: 2025-03-14

## 2025-08-13 ENCOUNTER — NON-APPOINTMENT (OUTPATIENT)
Age: 70
End: 2025-08-13